# Patient Record
Sex: MALE | Race: WHITE
[De-identification: names, ages, dates, MRNs, and addresses within clinical notes are randomized per-mention and may not be internally consistent; named-entity substitution may affect disease eponyms.]

---

## 2018-11-18 ENCOUNTER — HOSPITAL ENCOUNTER (EMERGENCY)
Dept: HOSPITAL 96 - M.ERS | Age: 33
Discharge: HOME | End: 2018-11-18
Payer: COMMERCIAL

## 2018-11-18 VITALS — BODY MASS INDEX: 23.11 KG/M2 | WEIGHT: 156 LBS | HEIGHT: 69 IN

## 2018-11-18 VITALS — DIASTOLIC BLOOD PRESSURE: 91 MMHG | SYSTOLIC BLOOD PRESSURE: 128 MMHG

## 2018-11-18 DIAGNOSIS — R07.89: ICD-10-CM

## 2018-11-18 DIAGNOSIS — R10.11: Primary | ICD-10-CM

## 2018-11-18 LAB
ABSOLUTE BASOPHILS: 0 THOU/UL (ref 0–0.2)
ABSOLUTE EOSINOPHILS: 0.4 THOU/UL (ref 0–0.7)
ABSOLUTE MONOCYTES: 0.5 THOU/UL (ref 0–1.2)
ALBUMIN SERPL-MCNC: 4.1 G/DL (ref 3.4–5)
ALP SERPL-CCNC: 107 U/L (ref 46–116)
ALT SERPL-CCNC: 26 U/L (ref 30–65)
ANION GAP SERPL CALC-SCNC: 10 MMOL/L (ref 7–16)
AST SERPL-CCNC: 17 U/L (ref 15–37)
BASOPHILS NFR BLD AUTO: 0.5 %
BILIRUB SERPL-MCNC: 0.2 MG/DL
BILIRUB UR-MCNC: NEGATIVE MG/DL
BUN SERPL-MCNC: 23 MG/DL (ref 7–18)
CALCIUM SERPL-MCNC: 8.7 MG/DL (ref 8.5–10.1)
CHLORIDE SERPL-SCNC: 105 MMOL/L (ref 98–107)
CO2 SERPL-SCNC: 26 MMOL/L (ref 21–32)
COLOR UR: YELLOW
CREAT SERPL-MCNC: 1 MG/DL (ref 0.6–1.3)
EOSINOPHIL NFR BLD: 5.5 %
GLUCOSE SERPL-MCNC: 109 MG/DL (ref 70–99)
GRANULOCYTES NFR BLD MANUAL: 53.8 %
HCT VFR BLD CALC: 45.4 % (ref 42–52)
HGB BLD-MCNC: 15.7 GM/DL (ref 14–18)
KETONES UR STRIP-MCNC: NEGATIVE MG/DL
LIPASE: 236 U/L (ref 73–393)
LYMPHOCYTES # BLD: 2.1 THOU/UL (ref 0.8–5.3)
LYMPHOCYTES NFR BLD AUTO: 32.7 %
MCH RBC QN AUTO: 31 PG (ref 26–34)
MCHC RBC AUTO-ENTMCNC: 34.5 G/DL (ref 28–37)
MCV RBC: 89.7 FL (ref 80–100)
MONOCYTES NFR BLD: 7.5 %
MPV: 8.4 FL. (ref 7.2–11.1)
NEUTROPHILS # BLD: 3.5 THOU/UL (ref 1.6–8.1)
NUCLEATED RBCS: 0 /100WBC
PLATELET COUNT*: 203 THOU/UL (ref 150–400)
POTASSIUM SERPL-SCNC: 3.7 MMOL/L (ref 3.5–5.1)
PROT SERPL-MCNC: 7.1 G/DL (ref 6.4–8.2)
PROT UR QL STRIP: NEGATIVE
RBC # BLD AUTO: 5.06 MIL/UL (ref 4.5–6)
RBC # UR STRIP: NEGATIVE /UL
RDW-CV: 11.9 % (ref 10.5–14.5)
SODIUM SERPL-SCNC: 141 MMOL/L (ref 136–145)
SP GR UR STRIP: 1.02 (ref 1–1.03)
TROPONIN-I LEVEL: <0.06 NG/ML (ref ?–0.06)
URINE CLARITY: CLEAR
URINE GLUCOSE-RANDOM: NEGATIVE
URINE LEUKOCYTES-REFLEX: NEGATIVE
URINE NITRITE-REFLEX: NEGATIVE
UROBILINOGEN UR STRIP-ACNC: 0.2 E.U./DL (ref 0.2–1)
WBC # BLD AUTO: 6.5 THOU/UL (ref 4–11)

## 2018-11-19 NOTE — EKG
[place] [address]
Phone:  [phone]                     ELECTROCARDIOGRAM REPORT      
_______________________________________________________________________________
 
Name:       DEMARCO GARCIA          Room:                      [facility]  
DAREK#:  [id number]      Account #:      [id number]  
Admission:  18     Attend Phys:                         
Discharge:  18     Date of Birth:  [birth year]  
         Report #: [id number]
    [id number]
_______________________________________________________________________________
THIS REPORT FOR:  //name//                      
 
                         [facility] ED
                                       
Test Date:    2018               Test Time:    19:03:56
Pat Name:     DEMARCO GARCIA            Department:   
Patient ID:   [id number]            Room:          
Gender:       M                        Technician:   GILMA
:          1985               Requested By: Bruce Mao
Order Number: [id number] MD:   Waqas Marcum
                                 Measurements
Intervals                              Axis          
Rate:         101                      P:            68
NC:           154                      QRS:          68
QRSD:         86                       T:            26
QT:           343                                    
QTc:          445                                    
                           Interpretive Statements
Sinus tachycardia
No previous ECG available for comparison
 
Electronically Signed On 2018 11:44:03 CST by Waqas Marcum
[email or web address]
 
 
 
 
 
 
 
 
 
 
 
 
 
 
 
 
 
 
 
 
  <ELECTRONICALLY SIGNED>
                                           By: Waqas Marcum MD, [facility]      
  18     1144
D: 18 1903   _____________________________________
T: 18 1903   Waqas Marcum MD, FACC        /EPI